# Patient Record
Sex: FEMALE
[De-identification: names, ages, dates, MRNs, and addresses within clinical notes are randomized per-mention and may not be internally consistent; named-entity substitution may affect disease eponyms.]

---

## 2017-08-08 ENCOUNTER — OFFICE VISIT (OUTPATIENT)
Dept: FAMILY MEDICINE | Facility: CLINIC | Age: 47
End: 2017-08-08

## 2017-08-08 VITALS
RESPIRATION RATE: 16 BRPM | OXYGEN SATURATION: 96 % | WEIGHT: 183.4 LBS | BODY MASS INDEX: 29.47 KG/M2 | SYSTOLIC BLOOD PRESSURE: 120 MMHG | DIASTOLIC BLOOD PRESSURE: 79 MMHG | HEIGHT: 66 IN | TEMPERATURE: 97.3 F | HEART RATE: 58 BPM

## 2017-08-08 DIAGNOSIS — G63 POLYNEUROPATHY ASSOCIATED WITH UNDERLYING DISEASE (H): ICD-10-CM

## 2017-08-08 DIAGNOSIS — F43.23 ADJUSTMENT DISORDER WITH MIXED ANXIETY AND DEPRESSED MOOD: ICD-10-CM

## 2017-08-08 DIAGNOSIS — E66.3 OVERWEIGHT (BMI 25.0-29.9): Primary | ICD-10-CM

## 2017-08-08 DIAGNOSIS — B07.0 PLANTAR WARTS: ICD-10-CM

## 2017-08-08 PROBLEM — E11.9 DIABETES MELLITUS (H): Chronic | Status: ACTIVE | Noted: 2017-08-08

## 2017-08-08 PROBLEM — F31.9 BIPOLAR DISORDER (H): Status: ACTIVE | Noted: 2017-08-08

## 2017-08-08 RX ORDER — POLYETHYLENE GLYCOL 3350 17 G/17G
1 POWDER, FOR SOLUTION ORAL DAILY
COMMUNITY

## 2017-08-08 RX ORDER — AMOXICILLIN 250 MG
1 CAPSULE ORAL 2 TIMES DAILY
COMMUNITY

## 2017-08-08 ASSESSMENT — ANXIETY QUESTIONNAIRES
GAD7 TOTAL SCORE: 21
1. FEELING NERVOUS, ANXIOUS, OR ON EDGE: NEARLY EVERY DAY
5. BEING SO RESTLESS THAT IT IS HARD TO SIT STILL: NEARLY EVERY DAY
2. NOT BEING ABLE TO STOP OR CONTROL WORRYING: NEARLY EVERY DAY
3. WORRYING TOO MUCH ABOUT DIFFERENT THINGS: NEARLY EVERY DAY
6. BECOMING EASILY ANNOYED OR IRRITABLE: NEARLY EVERY DAY
IF YOU CHECKED OFF ANY PROBLEMS ON THIS QUESTIONNAIRE, HOW DIFFICULT HAVE THESE PROBLEMS MADE IT FOR YOU TO DO YOUR WORK, TAKE CARE OF THINGS AT HOME, OR GET ALONG WITH OTHER PEOPLE: EXTREMELY DIFFICULT
7. FEELING AFRAID AS IF SOMETHING AWFUL MIGHT HAPPEN: NEARLY EVERY DAY

## 2017-08-08 ASSESSMENT — PATIENT HEALTH QUESTIONNAIRE - PHQ9
SUM OF ALL RESPONSES TO PHQ QUESTIONS 1-9: 20
5. POOR APPETITE OR OVEREATING: NEARLY EVERY DAY

## 2017-08-08 NOTE — PROGRESS NOTES
"      HPI:       Robyn Walker is a 47 year old who presents for the following  Patient presents with:  Establish Care  47 year-old female with history of heroin use, bipolar disorder, ADHD, OCD (by report), HTN, newly diagnosed with DM presents to clinic to establish care.   1.[place] with [place] [facility] and has questions about this. Describes 1 episode of feeling light headed, cloudy in thinking and felt like she was going to faint. Has Glucometer but does not yet know how to use. Checked BS with relative's meter once and it was 150.  She also notes periodic dry mouth, increased thirst and increased urination. Reports occasional shooting pains in feet but notes has callouses on feet that are painful.  Weight has been increasing but states she often eats through the night when she can't sleep, but does not alwayremember eating. She is trying to walk more and because of her OCD, is always moving and cleaning.   2. Mental Health: feels like she can't stop moving and thinking. Was recently seen by PMH at [facility] and put on zoloft, but she stopped it, because it made her symptoms worse.  She reports having trouble with depression in the past.She broke up with a long term partner over a year ago which triggered her relapse into heroin use. She has been clean for past 14 months.  She was on methadone, but stopped that 3 months ago. She recalls being treated with other meds in the past including Abilify, but does not like how meds make her feel, so did not continue that.   3. Would like feet looked at. Has \"Callouses\" on bottoms of both feet. Thinks they were treated once but did not go away. She has been soaking and trimming with robert board or pumice stone.   Intends to continue receiving care at [facility].     Problem, Medication and Allergy Lists were reviewed and are current.     Patient Active Problem List    Diagnosis Date Noted     Bipolar disorder (H) 08/08/2017     Priority: Medium     Diabetes mellitus (H) " 2017     Priority: Medium   ,     Current Outpatient Prescriptions   Medication     PANTOPRAZOLE SODIUM PO     LEVOTHYROXINE SODIUM PO     HYDROCHLOROTHIAZIDE PO     GABAPENTIN PO     Acetaminophen (TYLENOL PO)     polyethylene glycol (MIRALAX/GLYCOLAX) powder     senna-docusate (SENOKOT-S;PERICOLACE) 8.6-50 MG per tablet     No current facility-administered medications for this visit.        Allergies   Allergen Reactions     Ibuprofen Difficulty breathing     Throat swelling     Patient is   a new patient to this clinic and so  I reviewed/updated the Past Medical History, the Family History and the Social History. ,   Past Medical History:   Diagnosis Date     ADHD      Depression      Graves disease      Heroin abuse      Methadone adverse reaction      OCD (obsessive compulsive disorder)    ,   Family History     Problem (# of Occurrences) Relation (Name,Age of Onset)    Bipolar Disorder (2) Sister, Brother: both bros    DIABETES (1) Maternal Grandmother    Substance Abuse (1) Mother: , overdose    Thyroid Cancer (1) Father: , gunshot       and   Social History     Social History     Marital status: Single     Spouse name: N/A     Number of children: 3     Years of education: N/A     Occupational History     unemployed      worked as  in the past. 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 Mirena     Other Topics Concern     None     Social History Narrative    Clean for 14 months. Close relationship with children.     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 He has cancer and is on  "dialysis. She currently lives with him.            Review of Systems:   Review of Systems   GEN: weight gain past few months. General fatigue and poor sleep.   RESP: negative for SOB or cough  CV: negative for chest pain, irregular heart beat. HIstory of HTN and has had peripheral edema. Good currently.Takes HCTZ.  GI: History GERD. Had endo in the past. On pantoprazole for this. History constipation; OK currently.   ENDO: as per HPI. Signs and sx of DM.   NEURO: used to get headaches, not currently. Shooting pains in feet, sometimes tingling.  : Has Mirena IUD; uncertain of how old this is. Sexually active once in past 6 months with former partner.        Physical Exam:   Patient Vitals for the past 24 hrs:   BP Temp Temp src Pulse Resp SpO2 Height Weight   08/08/17 1400 120/79 97.3  F (36.3  C) Oral 58 16 96 % 5' 6\" (167.6 cm) 183 lb 6.4 oz (83.2 kg)     Body mass index is 29.6 kg/(m^2).  Vitals were reviewed and were normal     Physical Exam  GEN: Sleepy at times, but mostly talkative and appropriate to questions  HEENT: EOM intact  NECK: Supple no thyroid nodules noted  LUNGS: CTA with air entry throughout  CV: HRRR, S1, S2, no MRG. Negative peripheral edema. Pedal pulses +2, pink and warm.   NEURO: sensation decreased to monofilament great toes bilaterally and right ball of foot. SKIN: has 8-10mm plantar wart right and left mid foot plantar surface and lateral edge right foot; tender to touch.  Calloused areas heels bilaterally. Thickened toenails; painted.    Results:       Assessment and Plan     Robyn was seen today for establish care.    Diagnoses and all orders for this visit:    Overweight (BMI 25.0-29.9)    Polyneuropathy associated with underlying disease (H)    Plantar warts    Adjustment disorder with mixed anxiety and depressed mood      There are no discontinued medications.   DM Type II by history and overweight with history HTN:  Discussed dietary changes: recommend decreasing carbohydrates, low " salt, low fat diet. To follow at [facility] with nutritionist and DM educator. Reviewed signs and symptoms of low blood sugar. Patient will return to clinic within the week to review use of glucometer with pharmacist. Plan is to follow with [facility] for DM.  Reviewed foot care and was given a foot massage in clinic.    Mental Health concerns  To follow with psychiatry at [facility] and counselor here. Recommend she tell psychiatrist how Zoloft 50mg made her feel. DAHLIA-7 and PHQ9 both positive for depression and anxiety, although denies suicidality.   Discussed [facility] worker. Has SW who can help her with housing when needed.     Plantar warts  Duct tape treatment reviewed and given written instructions.   Soak and gently pare wart with robert board or pumice stone in evening.   Next morning apply duct tape to cover wart and leave on for 6 days  May repeat for up to 2 months but be careful not to injure feet. Should examine feet nightly.     Insomnia  Was prescribed Trazodone 50mg, but stopped due to increased fatigue. She will follow with psychiatrist      Options for treatment and follow-up care were reviewed with the patient. Robyn Walker  engaged in the decision making process and verbalized understanding of the options discussed and agreed with the final plan.    [facility] signed  BRIAN Cabrales CNP

## 2017-08-08 NOTE — PATIENT INSTRUCTIONS
Robyn was seen today for establish care.     Diagnoses and all orders for this visit:     Overweight (BMI 25.0-29.9)     Polyneuropathy associated with underlying disease (H)     Plantar warts     Adjustment disorder with mixed anxiety and depressed mood     The following  Medications were stopped by patient: Zoloft and Trazodone    DM Type II by history and overweight with history HTN:  Discussed dietary changes: recommend decreasing carbohydrates, low salt, low fat diet. To follow at [facility] with nutritionist and DM educator. Reviewed signs and symptoms of low blood sugar. Patient will return to clinic within the week to review use of glucometer with pharmacist. Plan is to follow with [facility] for DM.  Reviewed foot care and was given a foot massage in clinic.     Mental Health concerns  To follow with psychiatry at [facility] and counselor here. Recommend she tell psychiatrist how Zoloft 50mg made her feel. DAHLIA-7 and PHQ9 both positive for depression and anxiety, although denies suicidality.   Discussed [facility] worker. Has SW who can help her with housing when needed.      Plantar warts  Duct tape treatment reviewed and given written instructions.   Soak and gently pare wart with robert board or pumice stone in evening.   Next morning apply duct tape to cover wart and leave on for 6 days  May repeat for up to 2 months but be careful not to injure feet. Should examine feet nightly.      Insomnia  Was prescribed Trazodone 50mg by psychiatry, but stopped due to increased fatigue. She will follow with psychiatrist        Options for treatment and follow-up care were reviewed with the patient. Robyn Walker  engaged in the decision making process and verbalized understanding of the options discussed and agreed with the final plan.     Release of information signed.   BRIAN Cabrales CNP

## 2017-08-08 NOTE — MR AVS SNAPSHOT
After Visit Summary   8/8/2017    Rboyn Walker    MRN: [id number]           Patient Information     Date Of Birth          1970        Visit Information        Provider Department      8/8/2017 1:00 PM Stephanie Peace APRN CNP [facility]        Today's Diagnoses     Overweight (BMI 25.0-29.9)    -  1    Polyneuropathy associated with underlying disease (H)        Plantar warts        Adjustment disorder with mixed anxiety and depressed mood          Care Instructions    Robyn was seen today for establish care.     Diagnoses and all orders for this visit:     Overweight (BMI 25.0-29.9)     Polyneuropathy associated with underlying disease (H)     Plantar warts     Adjustment disorder with mixed anxiety and depressed mood     The following  Medications were stopped by patient: Zoloft and Trazodone    DM Type II by history and overweight with history HTN:  Discussed dietary changes: recommend decreasing carbohydrates, low salt, low fat diet. To follow at [facility] with nutritionist and DM educator. Reviewed signs and symptoms of low blood sugar. Patient will return to clinic within the week to review use of glucometer with pharmacist. Plan is to follow with [facility] for DM.  Reviewed foot care and was given a foot massage in clinic.     Mental Health concerns  To follow with psychiatry at [facility] and counselor here. Recommend she tell psychiatrist how Zoloft 50mg made her feel. DAHLIA-7 and PHQ9 both positive for depression and anxiety, although denies suicidality.   Discussed [facility] worker. Has SW who can help her with housing when needed.      Plantar warts  Duct tape treatment reviewed and given written instructions.   Soak and gently pare wart with robert board or pumice stone in evening.   Next morning apply duct tape to cover wart and leave on for 6 days  May repeat for up to 2 months but be careful not to injure feet. Should  examine feet nightly.      Insomnia  Was prescribed Trazodone 50mg by psychiatry, but stopped due to increased fatigue. She will follow with psychiatrist        Options for treatment and follow-up care were reviewed with the patient. Robyn Walker  engaged in the decision making process and verbalized understanding of the options discussed and agreed with the final plan.     Release of information signed.   BRIAN Cabrales CNP             Follow-ups after your visit        Your next 10 appointments already scheduled     Aug 15, 2017  2:00 PM CDT   Return Visit with BRIAN Cabrales CNP   [facility] at [facility] ([facility])    [address] [place] [id number] [phone]              Who to contact     Please call your clinic at [phone] to:    Ask questions about your health    Make or cancel appointments    Discuss your medicines    Learn about your test results    Speak to your doctor   If you have compliments or concerns about an experience at your clinic, or if you wish to file a complaint, please contact [facility] Patient Relations at [phone] or email us at [email or web address]         Additional Information About Your Visit        [employer] Information     Kiit is an electronic gateway that provides easy, online access to your medical records. With PayPay, you can request a clinic appointment, read your test results, renew a prescription or communicate with your care team.     To sign up for Kiit visit the website at [email or web address]   You will be asked to enter the access code listed below, as well as some personal information. Please follow the directions to create your username and password.     Your access code is: [email or web address]  Expires: 2017 10:39 AM     Your access code will  in 90 days. If you need help or a new  "code, please contact your [facility] or call [phone] for assistance.        Care EveryWhere ID     This is your Care EveryWhere ID. This could be used by other organizations to access your [facility] medical records  [id number]        Your Vitals Were     Pulse Temperature Respirations Height Pulse Oximetry BMI (Body Mass Index)    58 97.3  F (36.3  C) (Oral) 16 5' 6\" (167.6 cm) 96% 29.6 kg/m2       Blood Pressure from Last 3 Encounters:   08/08/17 120/79    Weight from Last 3 Encounters:   08/08/17 183 lb 6.4 oz (83.2 kg)              Today, you had the following     No orders found for display       Primary Care Provider Office Phone # Fax #    Stephanie Peace BRIAN [phone]       [facility] [address] [address]        Equal Access to Services     HUSSAIN SHOEMAKER : Hadii aad ku hadasho Soomaali, waaxda luqadaha, qaybta kaalmada adeegyada, waxay idiin hayaan florian oakes . So [facility] [phone].    ATENCIÓN: Si habla español, tiene a gibson disposición servicios gratuitos de asistencia lingüística. Víctorjayshree al [phone].    We comply with applicable federal civil rights laws and [place] laws. We do not discriminate on the basis of race, color, national origin, age, disability sex, sexual orientation or gender identity.            Thank you!     Thank you for choosing [facility] AT [facility]  for your care. Our goal is always to provide you with excellent care. Hearing back from our patients is one way we can continue to improve our services. Please take a few minutes to complete the written survey that you may receive in the mail after your visit with us. Thank you!             Your Updated Medication List - Protect others around you: Learn how to safely use, store and throw away your medicines at www.disposemymeds.org.          This list is " accurate as of: 8/8/17 11:59 PM.  Always use your most recent med list.                   Brand Name Dispense Instructions for use Diagnosis    GABAPENTIN PO      Take 300 mg by mouth 3 times daily        HYDROCHLOROTHIAZIDE PO      Take 25 mg by mouth daily        LEVOTHYROXINE SODIUM PO      Take 100 mcg by mouth daily        PANTOPRAZOLE SODIUM PO      Take 40 mg by mouth 2 times daily (before meals)        polyethylene glycol powder    MIRALAX/GLYCOLAX     Take 1 capful by mouth daily        senna-docusate 8.6-50 MG per tablet    SENOKOT-S;PERICOLACE     Take 1 tablet by mouth 2 times daily        TYLENOL PO      Take 325 mg by mouth every 8 hours as needed for mild pain or fever

## 2017-08-09 ASSESSMENT — ANXIETY QUESTIONNAIRES: GAD7 TOTAL SCORE: 21

## 2017-08-15 ENCOUNTER — OFFICE VISIT (OUTPATIENT)
Dept: FAMILY MEDICINE | Facility: CLINIC | Age: 47
End: 2017-08-15

## 2017-08-15 VITALS
TEMPERATURE: 98.2 F | HEART RATE: 75 BPM | BODY MASS INDEX: 28.84 KG/M2 | SYSTOLIC BLOOD PRESSURE: 123 MMHG | DIASTOLIC BLOOD PRESSURE: 81 MMHG | WEIGHT: 178.7 LBS

## 2017-08-15 DIAGNOSIS — R63.4 LOSS OF WEIGHT: Primary | ICD-10-CM

## 2017-08-15 DIAGNOSIS — E11.9 TYPE 2 DIABETES MELLITUS WITHOUT COMPLICATION, WITHOUT LONG-TERM CURRENT USE OF INSULIN (H): ICD-10-CM

## 2017-08-15 RX ORDER — MULTIPLE VITAMINS W/ MINERALS TAB 9MG-400MCG
1 TAB ORAL DAILY
Qty: 100 TABLET | Refills: 3 | Status: SHIPPED | OUTPATIENT
Start: 2017-08-15

## 2017-08-15 NOTE — PROGRESS NOTES
HPI:       Robyn Walker is a 47 year old who presents for the following  No chief complaint on file.    47 year-old female presents for follow up. Seen last week to establish care. Newly diagnosed DM, needed help with glucometer. Has used friend's and BS around 150 when checked.  Denies symptoms of low blood sugar. Has felt well. Trying to eat better, but does not like some of the foods.  Has been eating less and losing weight.  She does not like to lose weight and is worried that thyroid is too low as drops weight rapidly when that occurs and has no appetite. Has noted rapid heart rate and looser stools recently.  No change in temperature. Last TSH in record review was low  2 months ago and no change in dose after that result.   Mood is OK. Feeling energetic usually.  Denies relapse of heroin Remains off methadone.    Problem, Medication and Allergy Lists were   Current Outpatient Prescriptions   Medication     multivitamin, therapeutic with minerals (MULTI-VITAMIN) TABS tablet     levonorgestrel (MIRENA) 20 MCG/24HR IUD     PANTOPRAZOLE SODIUM PO     LEVOTHYROXINE SODIUM PO     HYDROCHLOROTHIAZIDE PO     GABAPENTIN PO     Acetaminophen (TYLENOL PO)     polyethylene glycol (MIRALAX/GLYCOLAX) powder     senna-docusate (SENOKOT-S;PERICOLACE) 8.6-50 MG per tablet     No current facility-administered medications for this visit.      Patient Active Problem List    Diagnosis Date Noted     Bipolar disorder (H) 08/08/2017     Priority: Medium     Diabetes mellitus (H) 08/08/2017     Priority: Medium     Still living with and caring for friend with CKD on HD.  Patient is   an established patient of this clinic.,   Past Medical History:   Diagnosis Date     ADHD      Depression      Graves disease      Heroin abuse      Methadone adverse reaction      OCD (obsessive compulsive disorder)    ,   Family History     Problem (# of Occurrences) Relation (Name,Age of Onset)    Bipolar Disorder (2) Sister, Brother: both nancy     DIABETES (1) Maternal Grandmother    Substance Abuse (1) Mother: , overdose    Thyroid Cancer (1) Father: , gunshot       and   Social History     Social History     Marital status: Single     Spouse name: N/A     Number of children: 3     Years of education: N/A     Occupational History     unemployed      worked as  in the past. 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 Mirena     Other Topics Concern     None     Social History Narrative    Clean for 14 months. Close relationship with children.     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 She currently lives with him.            Review of Systems:   Review of Systems   GEN: weight loss, decreased appetite. Likes sweets and has been avoiding. Then has no appetite.  CV: negative for chest pain, irregular heart beat  RESP: negative for SOB  GI: loose stools, negative for abd pain  ENDO: as per HPI         Physical Exam:   Patient Vitals for the past 24 hrs:   BP Temp Temp src Pulse   08/15/17 1422 123/81 98.2  F (36.8  C) Oral 75     There is no height or weight on file to calculate BMI.  Vitals were reviewed and were normal     Physical Exam  GEN: alert talkative, appropriate  CV: HRRR, S1, S2, no MRG  RESP: clear to auscultation with air entry throughout      Results:     BS 85 on home glucometer  Assessment and Plan     Robyn was seen today for clinic care coordination - follow-up.    Diagnoses and all orders for this visit:    Loss of weight  -     multivitamin, therapeutic with minerals (MULTI-VITAMIN)  TABS tablet; Take 1 tablet by mouth daily  -     Cancel: TSH with free T4 reflex; Future  -     Cancel: TSH with free T4 reflex; Future  -     TSH with free T4 reflex; Future    Type 2 diabetes mellitus without complication, without long-term current use of insulin (H)  -     TSH with free T4 reflex; Future  -     Glucose; Future      There are no discontinued medications.  Options for treatment and follow-up care were reviewed with the patient. Robyn Walker  engaged in the decision making process and verbalized understanding of the options discussed and agreed with the final plan.    Request patient check glucometer with fasting BS result in lab on Friday 8/18 as no calibrating fluid.     Reviewed signs and symptoms Hypoglycemia, healthy eating, foot care.    To follow with [facility] as planned for DM ed and DM follow up    BRIAN Cabrales CNP

## 2017-08-15 NOTE — PATIENT INSTRUCTIONS
1. Diabetes Mellitus  Health eating: increased vegetables, limit carbohydrates. Include protein like lean meats, some eggs. Avoid sweets or in moderation.   Check Blood Sugar fasting in am, after a meal and in the evening for 1-2 weeks to get an understanding of how the BS is varying, then every morning.     2. Lab appointment [address] scheduled for Friday 8/18 at 10am. Fasting (nothing to eat). Blood sugar and thyroid    3. Has psychiatry appointment now. To follow there and with counseling here.

## 2017-08-22 ENCOUNTER — OFFICE VISIT (OUTPATIENT)
Dept: FAMILY MEDICINE | Facility: CLINIC | Age: 47
End: 2017-08-22

## 2017-08-22 VITALS
HEART RATE: 93 BPM | SYSTOLIC BLOOD PRESSURE: 138 MMHG | TEMPERATURE: 98.3 F | BODY MASS INDEX: 28.23 KG/M2 | RESPIRATION RATE: 16 BRPM | OXYGEN SATURATION: 96 % | WEIGHT: 174.9 LBS | DIASTOLIC BLOOD PRESSURE: 96 MMHG

## 2017-08-22 DIAGNOSIS — F11.93 OPIOID WITHDRAWAL (H): Primary | ICD-10-CM

## 2017-08-22 DIAGNOSIS — R11.0 NAUSEA: ICD-10-CM

## 2017-08-22 RX ORDER — ONDANSETRON 4 MG/1
4 TABLET, ORALLY DISINTEGRATING ORAL EVERY 8 HOURS PRN
Qty: 30 TABLET | Refills: 0 | Status: SHIPPED | OUTPATIENT
Start: 2017-08-22

## 2017-08-22 ASSESSMENT — PATIENT HEALTH QUESTIONNAIRE - PHQ9: SUM OF ALL RESPONSES TO PHQ QUESTIONS 1-9: 10

## 2017-08-22 NOTE — PROGRESS NOTES
HPI:       Robyn Walker is a 47 year old who presents for the following  No chief complaint on file.  See below for CC  47 year-old female presents to clinic for symptomatic withdrawal from methadone. Stopped 8-1-2017 which she conveyed at last visit and discussed concerns about abrupt cessation. Onset of symptoms was 3 days ago characterized by nausea, vomiting,  Abdominal cramps and some muscle tension, primarily left leg. Has noted tremors also. Appetite is decreased and not eating much, notes increase in GERD symptoms, mostly at night although still taking pantoprazole. Does eat at night when she can't sleep.  Also reports insomnia and overall lack of energy. Some anxiety. Denies temperature intolerance. Feeling really disheartened due to withdrawal symptoms and need to go back on Methadone. Visited Methadone clinic yesterday and resumed doses. Was told it will take several days for body to readjust and for symptoms to subside. Tried to be seen at [facility] yesterday but  Could not get in.   BS range from BGM review shows , mostly low 100s.  Has one reading of 59 and was symptomatic with this with nausea, drowsiness and took hard candy to resolve.       Problem, Medication and Allergy Lists were reviewed and are current.     Patient Active Problem List    Diagnosis Date Noted     Opioid withdrawal (H) 08/22/2017     Priority: Medium     Bipolar disorder (H) 08/08/2017     Priority: Medium     Diabetes mellitus (H) 08/08/2017     Priority: Medium     Chronic constipation 03/10/2016     Priority: Medium     Last Assessment & Plan:   A: Patient having issues with constipation, last BM 5 days ago. Abdomen with mild distention, otherwise soft and nontender on exam.    P: - initiate senna/colace BID prn daily  - continue miralax  - encouraged to drink more water and work on diet; eat more leafy greens  -RTC if no BM with addition of senna/colace use       Essential hypertension 03/10/2016     Priority: Medium      Last Assessment & Plan:   Elevated diastolic in clinic today but measures at health center weekly show 80% of measures < 140/90 and a couple SBP < 100.  Denies lightheadedness.  - HCTZ 25 mg daily       Gastroesophageal reflux disease without esophagitis 03/10/2016     Priority: Medium     Last Assessment & Plan:   Hx of acid reflux and heartburn  - Start pantoprazole 20 mg.  Increase to 40 mg daily if still have symptoms after 2 weeks.       Anxiety disorder 08/11/2014     Priority: Medium     Contraception 06/27/2014     Priority: Medium     Overview:   Mirena IUD since 3/11/16       Graves' disease 12/22/2010     Priority: Medium     Adjustment disorder with mixed anxiety and depressed mood 03/24/2009     Priority: Medium   ,     Current Outpatient Prescriptions   Medication Sig Dispense Refill     ondansetron (ZOFRAN-ODT) 4 MG ODT tab Take 1 tablet (4 mg) by mouth every 8 hours as needed for nausea 30 tablet 0     levonorgestrel (MIRENA) 20 MCG/24HR IUD 1 Device by Intrauterine route       multivitamin, therapeutic with minerals (MULTI-VITAMIN) TABS tablet Take 1 tablet by mouth daily 100 tablet 3     PANTOPRAZOLE SODIUM PO Take 40 mg by mouth 2 times daily (before meals)       LEVOTHYROXINE SODIUM PO Take 100 mcg by mouth daily       HYDROCHLOROTHIAZIDE PO Take 25 mg by mouth daily       GABAPENTIN PO Take 300 mg by mouth 3 times daily       Acetaminophen (TYLENOL PO) Take 325 mg by mouth every 8 hours as needed for mild pain or fever       polyethylene glycol (MIRALAX/GLYCOLAX) powder Take 1 capful by mouth daily       senna-docusate (SENOKOT-S;PERICOLACE) 8.6-50 MG per tablet Take 1 tablet by mouth 2 times daily     ,     Allergies   Allergen Reactions     Ibuprofen Difficulty breathing     Throat swelling     Patient is   an established patient of this clinic.,   Past Medical History:   Diagnosis Date     ADHD      Depression      Graves disease      Heroin abuse      Methadone adverse reaction      OCD  "(obsessive compulsive disorder)    ,   Family History     Problem (# of Occurrences) Relation (Name,Age of Onset)    Bipolar Disorder (2) Sister, Brother: both bros    DIABETES (1) Maternal Grandmother    Substance Abuse (1) Mother: , overdose    Thyroid Cancer (1) Father: , gunshot       and   Social History     Social History     Marital status: Single     Spouse name: N/A     Number of children: 3     Years of education: N/A     Occupational History     unemployed      worked as  in the past. 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 Mirena     Other Topics Concern     Not on file     Social History Narrative    Clean for 14 months. Close relationship with children.     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 She currently lives with him.            Review of Systems:   Review of Systems   GEN: denies fever, chills. Decreased appetite and genera malaise as above  EYES; denies vision changes  RESP: negative for SOB  CV: negative for chest pain but felt heart pounding last night. OK today. Gets BP checked at center by her home and it has been OK. Has list of those. DBP anywhere from 66-96. SBP 120s-150s  GI: as per HPI; nausea, vomiting,  Anorexia, cramping; \"achiness all over:\". Denies diarrhea. Has Lomotil at home, because has noted this previously with withdrawal symptoms, but OK currently. Biggest problem is nausea and inability to eat. Has been drinking a large amount of water daily and tolerating.   : voiding OK. Denies urgency, frequency dysuria or " vaginal discharge.   MSK: muscle tension as per HPI; feels painful. Used ice last night to resolve.   NEURO: slight headache today. Hand tremors.  MOOD: feeling down and depressed. Disappointed that could not get off methadone cold turkey. States caring for chronically ill man is also depressing at times. Strong family supports. Has appointment with counselor next.           Physical Exam:   BP (!) 138/96 (BP Location: Right arm, Patient Position: Sitting, Cuff Size: Adult Regular)  Pulse 93  Temp 98.3  F (36.8  C) (Oral)  Resp 16  Wt 174 lb 14.4 oz (79.3 kg)  LMP   SpO2 96%  BMI 28.23 kg/m2      Vital signs normal except BP elevated 138/96 but within range of other documented BPs. Weight stable from previous. BP recheck 158/95.  Physical Exam  GEN: alert, mildly anxious, appropriate to conversation  EYES: KALPESH, EOM intact  RESP: CTA with air entry throughout  CV: HRRR, S1, S2, no MRG. BP as above.  GI: Abdomens soft rounded with BS x4. Diffuse tenderness all over, slight increase SP area. No masses. Negative CVAT.      Results:   Short Opioid Withdrawal Scale score is 13  PHQ 9 score n10    Assessment and Plan     Diagnoses and all orders for this visit:    Opioid withdrawal (H)  -     ondansetron (ZOFRAN-ODT) 4 MG ODT tab; Take 1 tablet (4 mg) by mouth every 8 hours as needed for nausea    Nausea  -     ondansetron (ZOFRAN-ODT) 4 MG ODT tab; Take 1 tablet (4 mg) by mouth every 8 hours as needed for nausea      Depression and anxiety worsened by current withdrawal symptoms. Will need to reevaluate when stable.   Discussed signs and symptoms of opioid withdrawal. If develops worsening symptoms. Must go to ED for additional care, lab work. Unable to do lab work here, but has appointment at  for lab work today. Requested BMP and CBC to be added. To check BP at [facility] tomorrow and if DBP gets higher, seek care. Watch for headaches, vision changes, weakness, speech concerns.   Continue with increased  fluids. Take multiple vitamin.   Seek care if unable to tolerate oral intake.  Consider work up of HERO after stable.   Discussed need to take one day at a time. Reinforced her decision making to seek care and utilize family supports and counseling supports.   Continue to monitor blood sugar. Reviewed signs and symptoms of low blood sugar: shakiness, confusion, change in consciousness, light headedness, sweating.   There are no discontinued medications.  Options for treatment and follow-up care were reviewed with the patient. Robyn Walker  engaged in the decision making process and verbalized understanding of the options discussed and agreed with the final plan.    BRIAN Cabrales CNP

## 2017-08-22 NOTE — PATIENT INSTRUCTIONS
Opioid withdrawal (H)  -     ondansetron (ZOFRAN-ODT) 4 MG ODT tab; Take 1 tablet (4 mg) by mouth every 8 hours as needed for nausea    Nausea  -     ondansetron (ZOFRAN-ODT) 4 MG ODT tab; Take 1 tablet (4 mg) by mouth every 8 hours as needed for nausea    Depression and anxiety worsened by current withdrawal symptoms. Will need to reevaluate when stable.  Discussed signs and symptoms of opioid withdrawal. If develops worsening symptoms. Must go to ED for additional care, lab work. Unable to do lab work here. Has lab appointment at [facility] today. Requested CBC, BMP. To check BP at [facility] tomorrow and if DBP gets higher, seek care. Watch for headaches, vision changes, weakness, speech concerns. Goal BP is < 140/80.  Continue with increased fluids. Take multiple vitamin.   Seek care if unable to tolerate oral intake.  Consider work up of HERO after stable.   Discussed need to take one day at a time. Reinforced her decision making to seek care and utilize family supports and counseling supports.   Continue to monitor blood sugar. Reviewed signs and symptoms of low blood sugar: shakiness, confusion, change in consciousness, light headedness, sweating.

## 2017-08-28 DIAGNOSIS — E11.9 TYPE 2 DIABETES MELLITUS (H): Primary | ICD-10-CM

## 2017-08-31 ENCOUNTER — TELEPHONE (OUTPATIENT)
Dept: FAMILY MEDICINE | Facility: CLINIC | Age: 47
End: 2017-08-31

## 2017-08-31 DIAGNOSIS — E03.9 ACQUIRED HYPOTHYROIDISM: Primary | ICD-10-CM

## 2017-08-31 RX ORDER — LEVOTHYROXINE SODIUM 75 UG/1
75 TABLET ORAL DAILY
Qty: 30 TABLET | Refills: 1 | Status: SHIPPED | OUTPATIENT
Start: 2017-08-31

## 2017-08-31 NOTE — PROGRESS NOTES
Levothyroxine decreased to 75mcg in view of low TSH. Will recheck TSH in 6-8 weeks. Stephanie TORRES CNP

## 2017-08-31 NOTE — TELEPHONE ENCOUNTER
Telephone call to patient to follow up on TSH result. Unable to reach earlier but letter was sent. TSH 0.018 below normal. Recommend decreasing levothyroxine to 75mcg per day and discontinuing 100mcg tablet. She is in agreement with this plan. This may account for some of the symptoms (weight loss, heart palpitations, loose stools) she has been experiencing. She is back on Methadone and feeling much better. Nausea has resolved and eating better. She indicates she is also almost out of BP medications and [facility] would not refill. She should have her BP rechecked as it was high at last visit.  She will be at [facility] tomorrow and will try to see pharmacist for BP recheck. Otherwise she will return next week.  She has enough medicine for another week. Stephanie TORRES CNP

## 2017-09-05 NOTE — TELEPHONE ENCOUNTER
Patient was seen at [facility] on 8/1/2017. BP was checked and was 132/92. Pharmacist verified that patient did have Rx for gabapentin and HCTZ to  at pharmacy. She should follow up for BP recheck within the month. Stephanie TORRES CNP

## 2017-10-31 ENCOUNTER — OFFICE VISIT (OUTPATIENT)
Dept: FAMILY MEDICINE | Facility: CLINIC | Age: 47
End: 2017-10-31

## 2017-10-31 VITALS
TEMPERATURE: 98.5 F | DIASTOLIC BLOOD PRESSURE: 78 MMHG | OXYGEN SATURATION: 98 % | RESPIRATION RATE: 16 BRPM | WEIGHT: 187.7 LBS | BODY MASS INDEX: 30.3 KG/M2 | SYSTOLIC BLOOD PRESSURE: 116 MMHG | HEART RATE: 62 BPM

## 2017-10-31 DIAGNOSIS — R73.03 PREDIABETES: ICD-10-CM

## 2017-10-31 DIAGNOSIS — M25.561 RIGHT KNEE PAIN, UNSPECIFIED CHRONICITY: ICD-10-CM

## 2017-10-31 DIAGNOSIS — E03.8 OTHER SPECIFIED HYPOTHYROIDISM: Primary | ICD-10-CM

## 2017-10-31 DIAGNOSIS — Z13.220 LIPID SCREENING: ICD-10-CM

## 2017-10-31 DIAGNOSIS — R06.02 SOB (SHORTNESS OF BREATH): ICD-10-CM

## 2017-10-31 DIAGNOSIS — R00.2 PALPITATIONS: ICD-10-CM

## 2017-10-31 ASSESSMENT — PAIN SCALES - GENERAL: PAINLEVEL: SEVERE PAIN (6)

## 2017-10-31 NOTE — PROGRESS NOTES
HPI:       Robyn Walker is a 47 year old who presents for the following  Patient presents with:  Insomnia  and follow up (thyroid, diabetes, BP)  Back on Methadone 40mg daily (Tapered self August 2017 and resumed end August)    1 Sleep: Taking trazodone 50mg past 2 nights and has been able to sleep for 5 hours.Usually has difficulty falling asleep and staying asleep. Often gets up at night and eats. Got Trazodone in June or July but did not use. Recently seen [place] [facility] and go mirtazapine and melatonin but not taking.   2 episodes of sleep walking which she does not recall. Occurred prior to starting Trazodone. No history of this. [facility] sleep apnea. She does not know if she snores. Does not awaken feeling refreshed and often falls asleep during the day.     2. BP: BP log review: BP range from 110-150/: 5 out of 22 readings elevated. Most readings in low 100s/80s.Has been watching salt in diet. Reports feet and ankles sometimes swollen. Taking HCTZ.     3. Diabetes:  chart review reveals likely pre-DM. BS log review: . Mostly low 100s.. Eats when she awakens at night: mostly carbohydrates. Diet consists of meat and carbohydrates. Does not like vegetables.     4. Right knee pain: has OA, so chronic pain but worst past 2-3 weeks. Red, swollen past 2 weeks. Has tried ACE, rest, ice, elevation. History of bilateral knee swelling. Does not want aspiration. No recent films. Knee sometimes feels like it will give out on her. Able to walk and do usual function. Denies numbness toes but sometimes gets shooting pains into toes and fingers.     5. Thyroid. Due for recheck of TSH. Dose was to be decreased early September, but she picked up her other RX and continued to take 100mcg per day instead of 75mcg.. Continues to have palpitations and hot flashes. Now gaining weight. Was losing weight when off Methadone. Dislikes weight gain.     6. Mood. Good day today, but has rough days. 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  He is less depressed so she feels better. Sleeps on cough there.  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     Problem, Medication and Allergy Lists were   reviewed and are current.     Patient Active Problem List    Diagnosis Date Noted     Right knee pain 10/31/2017     Priority: Medium     Opioid withdrawal (H) 08/22/2017     Priority: Medium     Bipolar disorder (H) 08/08/2017     Priority: Medium     Diabetes mellitus (H) 08/08/2017     Priority: Medium     Chronic constipation 03/10/2016     Priority: Medium     Last Assessment & Plan:   A: Patient having issues with constipation, last BM 5 days ago. Abdomen with mild distention, otherwise soft and nontender on exam.    P: - initiate senna/colace BID prn daily  - continue miralax  - encouraged to drink more water and work on diet; eat more leafy greens  -RTC if no BM with addition of senna/colace use       Essential hypertension 03/10/2016     Priority: Medium     Last Assessment & Plan:   Elevated diastolic in clinic today but measures at health center weekly show 80% of measures < 140/90 and a couple SBP < 100.  Denies lightheadedness.  - HCTZ 25 mg daily       Gastroesophageal reflux disease without esophagitis 03/10/2016     Priority: Medium     Last Assessment & Plan:   Hx of acid reflux and heartburn  - Start pantoprazole 20 mg.  Increase to 40 mg daily if still have symptoms after 2 weeks.       Anxiety disorder 08/11/2014     Priority: Medium     Contraception 06/27/2014     Priority: Medium     Overview:   Mirena IUD since 3/11/16       Graves' disease 12/22/2010     Priority: Medium     Adjustment disorder with mixed anxiety and depressed mood 03/24/2009     Priority: Medium         Current Outpatient Prescriptions   Medication Sig Dispense Refill     levothyroxine (SYNTHROID/LEVOTHROID) 75 MCG tablet Take 1 tablet (75 mcg) by mouth daily 30 tablet 1     ondansetron (ZOFRAN-ODT) 4 MG ODT tab Take 1 tablet (4 mg)  by mouth every 8 hours as needed for nausea 30 tablet 0     levonorgestrel (MIRENA) 20 MCG/24HR IUD 1 Device by Intrauterine route       multivitamin, therapeutic with minerals (MULTI-VITAMIN) TABS tablet Take 1 tablet by mouth daily 100 tablet 3     PANTOPRAZOLE SODIUM PO Take 40 mg by mouth 2 times daily (before meals)       HYDROCHLOROTHIAZIDE PO Take 25 mg by mouth daily       GABAPENTIN PO Take 300 mg by mouth 3 times daily       Acetaminophen (TYLENOL PO) Take 325 mg by mouth every 8 hours as needed for mild pain or fever       polyethylene glycol (MIRALAX/GLYCOLAX) powder Take 1 capful by mouth daily       senna-docusate (SENOKOT-S;PERICOLACE) 8.6-50 MG per tablet Take 1 tablet by mouth 2 times daily           Allergies   Allergen Reactions     Ibuprofen Difficulty breathing and Swelling     Throat swelling     Patient is   an established patient of this clinic.,   Past Medical History:   Diagnosis Date     ADHD      Depression      Graves disease      Heroin abuse      Methadone adverse reaction      OCD (obsessive compulsive disorder)    ,   Family History     Problem (# of Occurrences) Relation (Name,Age of Onset)    Bipolar Disorder (2) Sister, Brother: both bros    DIABETES (1) Maternal Grandmother    Substance Abuse (1) Mother: , overdose    Thyroid Cancer (1) Father: , gunshot       and   Social History     Social History     Marital status: Single     Spouse name: N/A     Number of children: 3     Years of education: N/A     Occupational History     unemployed      worked as  in the past. 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 Mirena     Other Topics Concern     Not on file     Social History Narrative    Clean for 14 months. Close relationship with children.     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 She currently lives with him.            Review of Systems:   Review of Systems   GEN: reports weight gain, fatigue, denies fever  EENT: No recent eye exam, missed appointment. Plans to reschedule. Occasionally feels like cannot swallow decently.   NECK:  FMH of thyroid cancer so patient worried about this.   CV: Denies chest pain, reports palpitations periodically  RESP: occasional SOB, mostly with exercise and lying down. Denies cough or wheeze.  GI: no nausea, vomiting. Stool OK  MSK: right knee pain as per HPI. Callouses on feet bilaterally that are painful.   NEURO: denies numbness or tingling feet and hands, but shooting pains as per HPI  Mood: as per HPI       Physical Exam:   Patient Vitals for the past 24 hrs:   BP Temp Temp src Pulse Resp SpO2 Weight   10/31/17 1104 (!) 145/93 98.5  F (36.9  C) Oral 62 16 98 % 187 lb 11.2 oz (85.1 kg)     Body mass index is 30.3 kg/(m^2).   /78  Pulse 62  Temp 98.5  F (36.9  C) (Oral)  Resp 16  Wt 187 lb 11.2 oz (85.1 kg)  SpO2 98%  BMI 30.3 kg/m2    Vital signs normal except BP elevated initially. Recheck at end of visit showed BP of 116/78. Checked against home BP monitor which was 113/83.     Physical Exam    GEN: alert talkative in no acute distress  NECK: no thyroid nodules palpated; not enlarged.  CV: HRRR, S1, S2, no MRG. Negative peripheral edema  RESP: Lungs CTA with air entry throughout  MSK: right knee edematous, slightly warm to touch. Tenderness along medial aspect and joint line. ROM intact but elicits pain. + Michael right. Pedal pulses +2 bilaterally.   FOOT EXAM: decreased sensation to monofilament great toes, balls of feet and heels bilaterally. Thickened toenails all  nails.Cap  refill 2-3 seconds. Callouses versus plantar warts balls of both feet. 2 8mm dry callouses plantar surface left foot and one 20mm lesion right ball of foot.   NEURO: foot exam as above.     Results:       Assessment and Plan     Robyn was seen today for insomnia.    Diagnoses and all orders for this visit:    Other specified hypothyroidism  -     TSH with free T4 reflex; Future    Right knee pain, unspecified chronicity  -     CBC with platelets differential; Future  -     CRP inflammation; Future  -     ORTHOPEDICS ADULT REFERRAL    Lipid screening  -     Lipid panel reflex to direct LDL Fasting; Future    Prediabetes  -     Hemoglobin A1c; Future    SOB (shortness of breath)  -     N terminal pro BNP outpatient; Future    Palpitations  -     Basic metabolic panel; Future      There are no discontinued medications.   1. Diabetes  Schedule labs at [facility] [phone]: A1C, Glucose  Continue to work or cutting down on carbohydrates, like bread, cereal, sweets. Try increasing vegetables in combination foods    2. Blood pressure is generally well controlled.  BP monitor matches reading in clinic. To Hold  arm by  heart when  takes  blood pressure.    3. Right knee pain  Pt to call to make orthopedic referral. Given info below:  [facility]    [facility]  Hours: 1PM-6PM, M-Th  1PM-5PM F    [address]  [facility] [address]    Call [phone] for a same-day appointment.    Call to make PT appointment. Physical Therapy    [address], [address]    View Map    7AM - 5:30PM, M-F    Appointments    [phone]    4. Insomnia  OK to continue with trazodone. Don't take mirtazapine if taking trazodone.  Schedule sleep study appointment  Sleep Clinic    [address], [address]  [facility] [address]    View Map    Appointments    [phone]    5. Labs: Lipids, basic metabolic, A1c, CBC with differential,  CRP, BNP, TSH with reflex. Call to schedule labs at [facility]: [phone]  Recommend flu vaccine and pneumo 23. Do not have immunizations available in this clinic.     Options for treatment and follow-up care were reviewed with the patient. Robyn Walker  engaged in the decision making process and verbalized understanding of the options discussed and agreed with the final plan.    BRIAN Cabrales CNP

## 2017-10-31 NOTE — PATIENT INSTRUCTIONS
1. Diabetes  Schedule labs at [facility] [phone]: A1C, Glucose  Continue to work or cutting down on carbohydrates, like bread, cereal, sweets. Try increasing vegetables in combination foods    2. Blood pressure is generally well controlled. Your BP monitor matches ours. Hold your arm by your heart when you take your blood pressure.    3. Right knee pain  call to make orthopedic referral.   [facility]    [facility]  Hours: 1PM-6PM, M-Th  1PM-5PM F    [address]  [facility] [address]    Call [phone] for a same-day appointment.    Call to make PT appointment. Physical Therapy    [address], [address]    View Map    7AM - 5:30PM, M-F    Appointments    [phone]    4. Insomnia  OK to continue with trazodone. Don't take mirtazapine if taking trazodone.  Schedule sleep study appointment  Sleep Clinic    [address], [address]  [facility] [address]    View Map    Appointments    [phone]    5. Labs: Lipids, basic metabolic, A1c, CBC with differential, CRP, BNP, TSH with reflex. Call to schedule labs at NP [facility]: [phone]

## 2017-11-07 ENCOUNTER — TELEPHONE (OUTPATIENT)
Dept: FAMILY MEDICINE | Facility: CLINIC | Age: 47
End: 2017-11-07

## 2017-11-07 NOTE — TELEPHONE ENCOUNTER
Received TC from patient. Needs referral [place]ed [place] [facility] [place].  Did not yet go to same day ortho clinic for evaluation of right knee pain, which continues to be painful and swollen. Given number again to call for same day appointment for ortho evaluation today. Has made eye appointment and has lab appointment on Thursday. Faxed referral to [facility]. See scanned copy. Stephanie TORRES CNP

## 2017-11-09 DIAGNOSIS — M25.561 RIGHT KNEE PAIN, UNSPECIFIED CHRONICITY: ICD-10-CM

## 2017-11-09 DIAGNOSIS — R00.2 PALPITATIONS: ICD-10-CM

## 2017-11-09 DIAGNOSIS — R73.03 PREDIABETES: ICD-10-CM

## 2017-11-09 DIAGNOSIS — Z13.220 LIPID SCREENING: ICD-10-CM

## 2017-11-09 DIAGNOSIS — R06.02 SOB (SHORTNESS OF BREATH): ICD-10-CM

## 2017-11-09 DIAGNOSIS — E03.8 OTHER SPECIFIED HYPOTHYROIDISM: ICD-10-CM

## 2017-11-09 LAB
ANION GAP SERPL CALCULATED.3IONS-SCNC: 5 MMOL/L (ref 3–14)
BASOPHILS # BLD AUTO: 0.1 10E9/L (ref 0–0.2)
BASOPHILS NFR BLD AUTO: 0.6 %
BUN SERPL-MCNC: 9 MG/DL (ref 7–30)
CALCIUM SERPL-MCNC: 9.7 MG/DL (ref 8.5–10.1)
CHLORIDE SERPL-SCNC: 103 MMOL/L (ref 94–109)
CHOLEST SERPL-MCNC: 238 MG/DL
CO2 SERPL-SCNC: 31 MMOL/L (ref 20–32)
CREAT SERPL-MCNC: 0.82 MG/DL (ref 0.52–1.04)
CRP SERPL-MCNC: 4.1 MG/L (ref 0–8)
DIFFERENTIAL METHOD BLD: NORMAL
EOSINOPHIL # BLD AUTO: 0.2 10E9/L (ref 0–0.7)
EOSINOPHIL NFR BLD AUTO: 2.4 %
ERYTHROCYTE [DISTWIDTH] IN BLOOD BY AUTOMATED COUNT: 14.4 % (ref 10–15)
GFR SERPL CREATININE-BSD FRML MDRD: 74 ML/MIN/1.7M2
GLUCOSE SERPL-MCNC: 83 MG/DL (ref 70–99)
HBA1C MFR BLD: 6.5 % (ref 4.1–5.7)
HCT VFR BLD AUTO: 43.4 % (ref 35–47)
HDLC SERPL-MCNC: 41 MG/DL
HGB BLD-MCNC: 14.7 G/DL (ref 11.7–15.7)
IMM GRANULOCYTES # BLD: 0 10E9/L (ref 0–0.4)
IMM GRANULOCYTES NFR BLD: 0.1 %
LDLC SERPL CALC-MCNC: 169 MG/DL
LYMPHOCYTES # BLD AUTO: 2.1 10E9/L (ref 0.8–5.3)
LYMPHOCYTES NFR BLD AUTO: 22.6 %
MCH RBC QN AUTO: 31.3 PG (ref 26.5–33)
MCHC RBC AUTO-ENTMCNC: 33.9 G/DL (ref 31.5–36.5)
MCV RBC AUTO: 93 FL (ref 78–100)
MONOCYTES # BLD AUTO: 0.5 10E9/L (ref 0–1.3)
MONOCYTES NFR BLD AUTO: 5.3 %
NEUTROPHILS # BLD AUTO: 6.3 10E9/L (ref 1.6–8.3)
NEUTROPHILS NFR BLD AUTO: 69 %
NONHDLC SERPL-MCNC: 197 MG/DL
NRBC # BLD AUTO: 0 10*3/UL
NRBC BLD AUTO-RTO: 0 /100
NT-PROBNP SERPL-MCNC: 57 PG/ML (ref 0–125)
PLATELET # BLD AUTO: 243 10E9/L (ref 150–450)
POTASSIUM SERPL-SCNC: 3.8 MMOL/L (ref 3.4–5.3)
RBC # BLD AUTO: 4.69 10E12/L (ref 3.8–5.2)
SODIUM SERPL-SCNC: 140 MMOL/L (ref 133–144)
T4 FREE SERPL-MCNC: 0.9 NG/DL (ref 0.76–1.46)
TRIGL SERPL-MCNC: 143 MG/DL
TSH SERPL DL<=0.005 MIU/L-ACNC: 4.58 MU/L (ref 0.4–4)
WBC # BLD AUTO: 9.1 10E9/L (ref 4–11)

## 2017-11-09 NOTE — LETTER
November 13, 2017       TO: Robyn Walker  [address] [facility] [id number]       DearMsLai,    We are writing to inform you of your test results.    Test results indicate you may require additional follow up, see comment below.    Resulted Orders   Lipid panel reflex to direct LDL Fasting   Result Value Ref Range    Cholesterol 238 (H) <200 mg/dL      Comment:      Desirable:       <200 mg/dl    Triglycerides 143 <150 mg/dL    HDL Cholesterol 41 (L) >49 mg/dL    LDL Cholesterol Calculated 169 (H) <100 mg/dL      Comment:      Above desirable:  100-129 mg/dl  Borderline High:  130-159 mg/dL  High:             160-189 mg/dL  Very high:       >189 mg/dl      Non HDL Cholesterol 197 (H) <130 mg/dL      Comment:      Above Desirable:  130-159 mg/dl  Borderline high:  160-189 mg/dl  High:             190-219 mg/dl  Very high:       >219 mg/dl     CBC with platelets differential   Result Value Ref Range    WBC 9.1 4.0 - 11.0 10e9/L    RBC Count 4.69 3.8 - 5.2 10e12/L    Hemoglobin 14.7 11.7 - 15.7 g/dL    Hematocrit 43.4 35.0 - 47.0 %    MCV 93 78 - 100 fl    MCH 31.3 26.5 - 33.0 pg    MCHC 33.9 31.5 - 36.5 g/dL    RDW 14.4 10.0 - 15.0 %    Platelet Count 243 150 - 450 10e9/L    Diff Method Automated Method     % Neutrophils 69.0 %    % Lymphocytes 22.6 %    % Monocytes 5.3 %    % Eosinophils 2.4 %    % Basophils 0.6 %    % Immature Granulocytes 0.1 %    Nucleated RBCs 0 0 /100    Absolute Neutrophil 6.3 1.6 - 8.3 10e9/L    Absolute Lymphocytes 2.1 0.8 - 5.3 10e9/L    Absolute Monocytes 0.5 0.0 - 1.3 10e9/L    Absolute Eosinophils 0.2 0.0 - 0.7 10e9/L    Absolute Basophils 0.1 0.0 - 0.2 10e9/L    Abs Immature Granulocytes 0.0 0 - 0.4 10e9/L    Absolute Nucleated RBC 0.0    CRP inflammation   Result Value Ref Range    CRP Inflammation 4.1 0.0 - 8.0 mg/L   TSH with free T4 reflex   Result Value Ref Range    TSH 4.58 (H) 0.40 - 4.00 mU/L   N terminal pro BNP outpatient   Result Value Ref Range    N-Terminal  Pro Bnp 57 0 - 125 pg/mL      Comment:         Reference range shown and results flagged as abnormal are for the outpatient,   non acute settings. Establishing a baseline value for each individual patient   is useful for follow-up.  Suggested inpatient cut points for confirming diagnosis of CHF in an acute   setting are:   >450 pg/mL (age 18 to less than 50)   >900 pg/mL (age 50 to less than 75)   >1800 pg/mL (75 yrs and older)  An inpatient or emergency department NT-proPBNP <300 pg/mL effectively rules   out acute CHF, with 99% negative predictive value.      Basic metabolic panel   Result Value Ref Range    Sodium 140 133 - 144 mmol/L    Potassium 3.8 3.4 - 5.3 mmol/L    Chloride 103 94 - 109 mmol/L    Carbon Dioxide 31 20 - 32 mmol/L    Anion Gap 5 3 - 14 mmol/L    Glucose 83 70 - 99 mg/dL    Urea Nitrogen 9 7 - 30 mg/dL    Creatinine 0.82 0.52 - 1.04 mg/dL    GFR Estimate 74 >60 mL/min/1.7m2      Comment:      Non  GFR Calc    GFR Estimate If Black 89 >60 mL/min/1.7m2      Comment:       GFR Calc    Calcium 9.7 8.5 - 10.1 mg/dL   T4 free   Result Value Ref Range    T4 Free 0.90 0.76 - 1.46 ng/dL       Ledra, your thyroid results are OK. Your level is just a little bit above normal, but barely. I would like to monitor and recheck in 6 weeks. Because you missed some days prior to checking it,  It is difficult to know which dose would be better for you. I think you have been taking 100mcg so stick with that and take every day and then we will recheck in 6 weeks. Thanks

## 2018-01-21 ENCOUNTER — HEALTH MAINTENANCE LETTER (OUTPATIENT)
Age: 48
End: 2018-01-21

## 2018-02-05 ENCOUNTER — TELEPHONE (OUTPATIENT)
Dept: FAMILY MEDICINE | Facility: CLINIC | Age: 48
End: 2018-02-05

## 2018-02-05 NOTE — TELEPHONE ENCOUNTER
Gissel calling from [facility] is calling requesting a call back. She has some care coordination question she would like to ask. She can be reached at the incoming number.

## 2018-02-06 NOTE — TELEPHONE ENCOUNTER
2 attempts to reach caller from [facility] re: care coordination. Message left. Stephanie TORERS CNP